# Patient Record
Sex: FEMALE | Race: WHITE | URBAN - METROPOLITAN AREA
[De-identification: names, ages, dates, MRNs, and addresses within clinical notes are randomized per-mention and may not be internally consistent; named-entity substitution may affect disease eponyms.]

---

## 2017-05-01 NOTE — PROCEDURE NOTE: CLINICAL
PROCEDURE NOTE: Sub-Tenon’s Kenalog* OS. Diagnosis: Cystoid Macular Edema. Anesthesia: Topical. Prep: Betadine Flush. Prior to injection, risks/benefits/alternatives discussed including infection, loss of vision, hemorrhage, cataract, glaucoma, elevated intraocular pressure and lid swelling. Betadine prep was performed. Sub-Tenon’s injection of Kenalog 40 mg/1 ml was given. The remainder of the Sub-Tenon’s kenalog was discarded in the medical waste. Time of injection: 1;30. Patient tolerated procedure well. There were no complications. Post-op instructions given. Patient given office phone number/answering service number and advised to call immediately should there be an increase in floaters or redness, loss of vision or pain, or should they have any other questions or concerns. Queen Shekhar

## 2017-05-01 NOTE — PATIENT DISCUSSION
Based on today’s exam, diagnostic studies, and review of records, the determination was made for POSTERIOR SUBTENONS INJECTION OF KENALOG 40mg recommended TODAY after discussion of benefits, risks and alternatives. The injection was given without complication and tolerated well by the patient. Post-injection instructions were reviewed and understood by the patient. Procedure was performed without complications. Instructed to call immediately if any new distortion, blurring, decreased vision or eye pain.

## 2017-05-01 NOTE — PATIENT DISCUSSION
Capsular haze appears visually significant, RECOMMEND CONSULT with  DR GONZALEZ for possible YAG capsulotomy.

## 2017-06-06 NOTE — PATIENT DISCUSSION
Capsular haze appears visually significant, RECOMMEND CONSULT with  [facility] TO SEE IF PT NEEDS TO SEE DR Tanisha Mcghee for possible YAG capsulotomy.

## 2017-06-06 NOTE — PROCEDURE NOTE: CLINICAL
PROCEDURE NOTE: Sub-Tenon’s Kenalog* OS. Diagnosis: Cystoid Macular Edema. Anesthesia: Topical. Prep: Betadine Flush. Prior to injection, risks/benefits/alternatives discussed including infection, loss of vision, hemorrhage, cataract, glaucoma, elevated intraocular pressure and lid swelling. Betadine prep was performed. Sub-Tenon’s injection of Kenalog 40 mg/1 ml was given. The remainder of the Sub-Tenon’s kenalog was discarded in the medical waste. Time of injection: *. Patient tolerated procedure well. There were no complications. Post-op instructions given. Patient given office phone number/answering service number and advised to call immediately should there be an increase in floaters or redness, loss of vision or pain, or should they have any other questions or concerns. Samson Wilkins

## 2017-06-22 ENCOUNTER — IMPORTED ENCOUNTER (OUTPATIENT)
Dept: URBAN - METROPOLITAN AREA CLINIC 50 | Facility: CLINIC | Age: 67
End: 2017-06-22

## 2017-06-26 ENCOUNTER — IMPORTED ENCOUNTER (OUTPATIENT)
Dept: URBAN - METROPOLITAN AREA CLINIC 50 | Facility: CLINIC | Age: 67
End: 2017-06-26

## 2017-07-18 NOTE — PATIENT DISCUSSION
Capsular haze appears visually significant, RECOMMEND CONSULT with  [facility] TO SEE IF PT NEEDS TO SEE DR Becky Richey for possible YAG capsulotomy.

## 2017-08-10 NOTE — PATIENT DISCUSSION
ERM appears VISUALLY SIGNIFICANT - PT GETTING BY OK - NOT INTERFERING WITH ANY ACTIVITIES - THEREFORE TO FOLLOW.

## 2018-04-09 NOTE — PATIENT DISCUSSION
ERM appears BODERLINE VISUALLY SIGNIFICANT - PT HAVING SOME DIF - ERM PROB CONTRIBUTING TO REDUCED [facility] - PT TO TEST AT HOME AND IF SIG FUNCTIONAL DIF TO RETURN FOR POSSIBLE PPV.

## 2018-08-15 ENCOUNTER — IMPORTED ENCOUNTER (OUTPATIENT)
Dept: URBAN - METROPOLITAN AREA CLINIC 50 | Facility: CLINIC | Age: 68
End: 2018-08-15

## 2020-02-27 NOTE — PATIENT DISCUSSION
Capsular haze appears visually significant, RECOMMEND CONSULT with  [facility] TO SEE IF PT NEEDS TO SEE DR Christofer Pepe for possible YAG capsulotomy. Provider Procedure Text (C): After obtaining clear surgical margins the defect was repaired by another provider.

## 2021-04-17 ASSESSMENT — TONOMETRY
OS_IOP_MMHG: 14
OD_IOP_MMHG: 14

## 2021-04-17 ASSESSMENT — VISUAL ACUITY
OS_PH: 20/25-
OS_SC: 20/30-
OD_SC: 20/25-